# Patient Record
Sex: MALE | Race: WHITE | NOT HISPANIC OR LATINO | URBAN - METROPOLITAN AREA
[De-identification: names, ages, dates, MRNs, and addresses within clinical notes are randomized per-mention and may not be internally consistent; named-entity substitution may affect disease eponyms.]

---
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
## 2023-09-15 PROCEDURE — 87070 CULTURE OTHR SPECIMN AEROBIC: CPT

## 2023-09-15 PROCEDURE — 87205 SMEAR GRAM STAIN: CPT

## 2023-09-21 NOTE — PROGRESS NOTES
"Patient: Adam Buerger    YOB: 2000    Date: 09/22/2023    Primary Care Provider: Provider, No Known    Chief Complaint   Patient presents with    Cyst     Pilonodal        SUBJECTIVE:    History of present illness:  I saw the patient in the office today for the evaluation and treatment for a pilonidal cyst.  Patient was recently seen at Urgent Care where they started him on an oral antibiotics.  Wound cultures were negative. The patient states he has had the cyst for 6 months. He has bleeding from there cyst but no infection.  This is very close to the anus, unable to keep clean completely.  Stating \"closure.    The following portions of the patient's history were reviewed and updated as appropriate: allergies, current medications, past family history, past medical history, past social history, past surgical history and problem list.      Review of Systems   Constitutional:  Negative for chills, fever and unexpected weight change.   HENT:  Negative for trouble swallowing and voice change.    Eyes:  Negative for visual disturbance.   Respiratory:  Negative for apnea, cough, chest tightness, shortness of breath and wheezing.    Cardiovascular:  Negative for chest pain, palpitations and leg swelling.   Gastrointestinal:  Negative for abdominal distention, abdominal pain, anal bleeding, blood in stool, constipation, diarrhea, nausea, rectal pain and vomiting.   Endocrine: Negative for cold intolerance and heat intolerance.   Genitourinary:  Negative for difficulty urinating, dysuria, flank pain, scrotal swelling and testicular pain.   Musculoskeletal:  Negative for back pain, gait problem and joint swelling.   Skin:  Positive for wound. Negative for color change and rash.   Neurological:  Negative for dizziness, syncope, speech difficulty, weakness, numbness and headaches.   Hematological:  Negative for adenopathy. Does not bruise/bleed easily.   Psychiatric/Behavioral:  Negative for confusion. The " "patient is not nervous/anxious.      Allergies:  Allergies   Allergen Reactions    Penicillins Hives       Medications:    Current Outpatient Medications:     FLUoxetine (PROzac) 20 MG capsule, Take 1 capsule by mouth Daily., Disp: , Rfl:     mupirocin (BACTROBAN) 2 % ointment, Apply 1 application  topically to the appropriate area as directed 3 (Three) Times a Day., Disp: 22 g, Rfl: 0    sulfamethoxazole-trimethoprim (BACTRIM DS,SEPTRA DS) 800-160 MG per tablet, Take 1 tablet by mouth 2 (Two) Times a Day for 7 days., Disp: 14 tablet, Rfl: 0    History:  Past Medical History:   Diagnosis Date    Anxiety        Past Surgical History:   Procedure Laterality Date    DENTAL PROCEDURE      wisdom teeth       History reviewed. No pertinent family history.    Social History     Tobacco Use    Smoking status: Never    Smokeless tobacco: Never   Vaping Use    Vaping Use: Never used   Substance Use Topics    Alcohol use: Yes     Comment: very rare    Drug use: Never        OBJECTIVE:    Vital Signs:   Vitals:    09/22/23 1403   BP: 104/64   Pulse: 84   Temp: 98.4 °F (36.9 °C)   SpO2: 98%   Weight: 103 kg (228 lb)   Height: 177.8 cm (70\")       Physical Exam:   General Appearance:    Alert, cooperative, in no acute distress   Head:    Normocephalic, without obvious abnormality, atraumatic   Eyes:            Lids and lashes normal, conjunctivae and sclerae normal, no   icterus, no pallor, corneas clear, PERRLA   Ears:    Ears appear intact with no abnormalities noted   Throat:   No oral lesions, no thrush, oral mucosa moist   Neck:   No adenopathy, supple, trachea midline, no thyromegaly, no   carotid bruit, no JVD   Lungs:     Clear to auscultation,respirations regular, even and                  unlabored    Heart:    Regular rhythm and normal rate, normal S1 and S2, no            murmur, no gallop, no rub, no click   Chest Wall:    No abnormalities observed   Abdomen:     Normal bowel sounds, no masses, no organomegaly, soft  "       non-tender, non-distended, no guarding, no rebound                tenderness   Extremities:   Moves all extremities well, no edema, no cyanosis, no             redness   Pulses:   Pulses palpable and equal bilaterally   Skin:   No bleeding, bruising or rash.    Cyst, 4 cm  Granulation tissue, persistent only 1 to 2 cm from the rectal opening.   Lymph nodes:   No palpable adenopathy   Neurologic:   Cranial nerves 2 - 12 grossly intact, sensation intact, DTR       present and equal bilaterally     Results Review:   I reviewed the patient's new clinical results.    Review of Systems was reviewed and confirmed as accurate as documented by the MA.    ASSESSMENT/PLAN:    1. Pilonidal cyst        Patient has a pilonidal cyst very close to the anal opening.  He is agreeable to be seen back over to surgery for evaluation and management.  He will follow-up with me as needed.    I discussed the patients findings and my recommendations with patient        Electronically signed by Chloé Jones MD  09/22/23

## 2023-09-22 ENCOUNTER — OFFICE VISIT (OUTPATIENT)
Dept: SURGERY | Facility: CLINIC | Age: 23
End: 2023-09-22
Payer: COMMERCIAL

## 2023-09-22 VITALS
SYSTOLIC BLOOD PRESSURE: 104 MMHG | WEIGHT: 228 LBS | BODY MASS INDEX: 32.64 KG/M2 | DIASTOLIC BLOOD PRESSURE: 64 MMHG | TEMPERATURE: 98.4 F | HEART RATE: 84 BPM | HEIGHT: 70 IN | OXYGEN SATURATION: 98 %

## 2023-09-22 DIAGNOSIS — L05.91 PILONIDAL CYST: Primary | ICD-10-CM

## 2023-09-22 PROCEDURE — 99203 OFFICE O/P NEW LOW 30 MIN: CPT | Performed by: SURGERY

## 2023-12-07 PROCEDURE — 88304 TISSUE EXAM BY PATHOLOGIST: CPT | Performed by: COLON & RECTAL SURGERY

## 2023-12-08 ENCOUNTER — LAB REQUISITION (OUTPATIENT)
Dept: LAB | Facility: HOSPITAL | Age: 23
End: 2023-12-08
Payer: COMMERCIAL

## 2023-12-08 DIAGNOSIS — L05.91 PILONIDAL CYST WITHOUT ABSCESS: ICD-10-CM

## 2023-12-11 LAB
CYTO UR: NORMAL
LAB AP CASE REPORT: NORMAL
LAB AP CLINICAL INFORMATION: NORMAL
LAB AP DIAGNOSIS COMMENT: NORMAL
PATH REPORT.FINAL DX SPEC: NORMAL
PATH REPORT.GROSS SPEC: NORMAL

## 2025-02-26 ENCOUNTER — OFFICE VISIT (OUTPATIENT)
Dept: FAMILY MEDICINE CLINIC | Facility: CLINIC | Age: 25
End: 2025-02-26
Payer: COMMERCIAL

## 2025-02-26 VITALS
HEIGHT: 70 IN | OXYGEN SATURATION: 98 % | DIASTOLIC BLOOD PRESSURE: 78 MMHG | WEIGHT: 251.2 LBS | SYSTOLIC BLOOD PRESSURE: 114 MMHG | TEMPERATURE: 98.4 F | HEART RATE: 97 BPM | RESPIRATION RATE: 20 BRPM | BODY MASS INDEX: 35.96 KG/M2

## 2025-02-26 DIAGNOSIS — Z83.438 FAMILY HISTORY OF HYPERLIPIDEMIA: ICD-10-CM

## 2025-02-26 DIAGNOSIS — F41.9 ANXIETY AND DEPRESSION: ICD-10-CM

## 2025-02-26 DIAGNOSIS — Z76.89 ENCOUNTER TO ESTABLISH CARE WITH NEW DOCTOR: ICD-10-CM

## 2025-02-26 DIAGNOSIS — R41.840 POOR CONCENTRATION: Primary | ICD-10-CM

## 2025-02-26 DIAGNOSIS — Z13.29 SCREENING FOR THYROID DISORDER: ICD-10-CM

## 2025-02-26 DIAGNOSIS — F32.A ANXIETY AND DEPRESSION: ICD-10-CM

## 2025-02-26 RX ORDER — ATOMOXETINE 40 MG/1
40 CAPSULE ORAL DAILY
COMMUNITY
Start: 2025-02-18

## 2025-02-26 NOTE — PROGRESS NOTES
New Patient Office Visit      Date: 2025  Patient Name: Adam John Buerger  : 2000   MRN: [id number]     Chief Complaint:    Chief Complaint   Patient presents with    Behavioral [facility] Referral    Establish Care       History of Present Illness: Adam John Buerger is a 24 y.o. male who presents today to establish care.    Reports had pcp in .  No one since.  Went to college in [facility] and now out of college.  Reports he went to [facility].    Request possible referral to psychiatry, or neurology office.  Reports always had trouble focusing but starting to have memory issues.  Reports Alzheimer's runs in the family and wanted to get this looked at to be safe.  Great Grandmother had Alzheimers or Dementia in general.  He is not sure of the type of dementia.    Following with psych out of [facility] in [facility].    Prescribed Strattera for ADHD and Prozac for Anxiety and Depression.    Also wanted to have a local therapist because his current doctor is in [facility], and he lives in [place].     From reconcile outside meds, Strattera as noted, Prozac as noted    Reports he has trouble with focus and concentrating.  Strattera helps some.      Reports mostly has trouble remembering conversations, and schedules.        Subjective      Review of Systems:   Review of Systems   Constitutional:  Negative for chills and fever.   Gastrointestinal:  Negative for nausea and vomiting.   Neurological:  Negative for seizures and syncope.   Psychiatric/Behavioral:  Negative for suicidal ideas. The patient is nervous/anxious.         + Depression         Past Medical History:   Past Medical History:   Diagnosis Date    ADHD (attention deficit hyperactivity disorder)     Not diagnosed but my former therapist and current psychiatrist are pretty certain. Need resources to get proper diagnosis.    Anxiety     Depression     Seasonal       Past Surgical History:   Past Surgical History:   Procedure  "Laterality Date    DENTAL PROCEDURE      wisdom teeth       Family History:   Family History   Problem Relation Age of Onset    Cancer Mother         Ovarian Cancer    Depression Brother        Social History:   Social History     Socioeconomic History    Marital status: Single   Tobacco Use    Smoking status: Never     Passive exposure: Never    Smokeless tobacco: Never   Vaping Use    Vaping status: Never Used   Substance and Sexual Activity    Alcohol use: Yes     Alcohol/week: 5.0 standard drinks of alcohol     Types: 5 Cans of beer per week     Comment: very rare    Drug use: Never    Sexual activity: Yes     Partners: Female     Birth control/protection: Condom, Natural family planning/Rhythm       Medications:   Current Outpatient Medications   Medication Sig Dispense Refill    atomoxetine (STRATTERA) 40 MG capsule Take 1 capsule by mouth Daily.      FLUoxetine (PROzac) 40 MG capsule Take 1 capsule by mouth Daily.       No current facility-administered medications for this visit.        Allergies:   Allergies   Allergen Reactions    Penicillins Hives       Objective     Physical Exam:  Vital Signs:   Vitals:    02/26/25 1057   BP: 114/78   BP Location: Left arm   Patient Position: Sitting   Cuff Size: Large Adult   Pulse: 97   Resp: 20   Temp: 98.4 °F (36.9 °C)   TempSrc: Temporal   SpO2: 98%   Weight: 114 kg (251 lb 3.2 oz)   Height: 177.8 cm (70\")   PainSc: 0-No pain     Body mass index is 36.04 kg/m².     BMI is >= 30 and <35. (Class 1 Obesity). The following options were offered after discussion;: exercise counseling/recommendations and nutrition counseling/recommendations       Physical Exam  Vitals and nursing note reviewed.   Constitutional:       Appearance: Normal appearance.   HENT:      Head: Normocephalic and atraumatic.   Neck:      Vascular: No carotid bruit.   Cardiovascular:      Rate and Rhythm: Normal rate and regular rhythm.      Heart sounds: Normal heart sounds. No murmur " heard.  Pulmonary:      Effort: Pulmonary effort is normal.      Breath sounds: Normal breath sounds.   Abdominal:      General: Bowel sounds are normal.      Palpations: Abdomen is soft. There is no mass.      Tenderness: There is no abdominal tenderness.   Musculoskeletal:      Right lower leg: No edema.      Left lower leg: No edema.   Skin:     Coloration: Skin is not jaundiced or pale.      Findings: No erythema.   Neurological:      Mental Status: He is alert. Mental status is at baseline.   Psychiatric:         Mood and Affect: Mood normal.         Behavior: Behavior normal.         Procedures    PHQ-9: PHQ-9 Total Score:       POCT Results (if applicable):   Results for orders placed or performed in visit on 12/07/23   Tissue Pathology Exam    Collection Time: 12/07/23  8:17 AM    Specimen: Pilonidal cyst; Tissue   Result Value Ref Range    Case Report       Surgical Pathology Report                         Case: [id number]                                  Authorizing Provider:  Heri Bustillos MD        Collected:           12/07/2023 08:17 AM          Ordering Location:     [facility]   Received:            12/08/2023 08:17 AM                                 LABORATORY                                                                   Pathologist:           Kaya Miller MD                                                   Specimen:    Pilonidal cyst, Pilonidal Cyst                                                             Clinical Information       Pilonidal cyst without abscess      Final Diagnosis       PILONIDAL CYST   Dermal scar formation with chronic inflammation and rare foreign body type giant cells   See comment      Comment       These findings could represent the sequelae of a ruptured cyst.  An intact cyst is not identified in this material.      Gross Description       1. Pilonidal cyst.  Received in formalin labeled pilonidal cyst is a 2.7 x 1.1 x 0.9 cm moderately  disrupted, unoriented portion of tan skin and underlying soft tissue.  Sectioning reveals a possible disrupted cystic cavity up to 1 cm in greatest dimension.  No gross skin lesions are identified.  The specimen is submitted entirely in 2 cassettes.  LDP        Microscopic Description       The slides are reviewed and demonstrate histopathologic features supporting the above rendered diagnosis.           Measures:   Advanced Care Planning:   Patient does not have an advance directive, declines further assistance.    Smoking Cessation:   Does not smoke          Assessment / Plan      Assessment/Plan:     1. Poor concentration  1-2 referral to behavioral health.  Patient to continue current medications for now  - Ambulatory Referral to Behavioral Health    2. Anxiety and depression  As above.  - Ambulatory Referral to Behavioral Health    3. Screening for thyroid disorder  Check TSH.  - TSH; Future    4. Family history of hyperlipidemia  Check fasting lipid panel.  - Lipid Panel; Future    5. Encounter to establish care with new doctor  Check CBC, CMP.  - CBC & Differential; Future  - Comprehensive Metabolic Panel; Future         Part of this note may be an electronic transcription/translation of spoken language to printed text using the Dragon Dictation System.      Vaccine Counseling:      Follow Up:   Return in about 3 months (around 5/26/2025), or if symptoms worsen or fail to improve.      DO RACHEL Walsh

## 2025-03-12 ENCOUNTER — LAB (OUTPATIENT)
Dept: LAB | Facility: HOSPITAL | Age: 25
End: 2025-03-12
Payer: COMMERCIAL

## 2025-03-12 DIAGNOSIS — Z13.29 SCREENING FOR THYROID DISORDER: ICD-10-CM

## 2025-03-12 DIAGNOSIS — Z83.438 FAMILY HISTORY OF HYPERLIPIDEMIA: ICD-10-CM

## 2025-03-12 DIAGNOSIS — Z76.89 ENCOUNTER TO ESTABLISH CARE WITH NEW DOCTOR: ICD-10-CM

## 2025-03-12 LAB
ALBUMIN SERPL-MCNC: 3.7 G/DL (ref 3.5–5.2)
ALBUMIN/GLOB SERPL: 1.1 G/DL
ALP SERPL-CCNC: 60 U/L (ref 39–117)
ALT SERPL W P-5'-P-CCNC: 75 U/L (ref 1–41)
ANION GAP SERPL CALCULATED.3IONS-SCNC: 12.9 MMOL/L (ref 5–15)
AST SERPL-CCNC: 30 U/L (ref 1–40)
BASOPHILS # BLD AUTO: 0.06 10*3/MM3 (ref 0–0.2)
BASOPHILS NFR BLD AUTO: 0.8 % (ref 0–1.5)
BILIRUB SERPL-MCNC: 0.4 MG/DL (ref 0–1.2)
BUN SERPL-MCNC: 9 MG/DL (ref 6–20)
BUN/CREAT SERPL: 7.4 (ref 7–25)
CALCIUM SPEC-SCNC: 9.8 MG/DL (ref 8.6–10.5)
CHLORIDE SERPL-SCNC: 104 MMOL/L (ref 98–107)
CHOLEST SERPL-MCNC: 264 MG/DL (ref 0–200)
CO2 SERPL-SCNC: 25.1 MMOL/L (ref 22–29)
CREAT SERPL-MCNC: 1.22 MG/DL (ref 0.76–1.27)
DEPRECATED RDW RBC AUTO: 39.8 FL (ref 37–54)
EGFRCR SERPLBLD CKD-EPI 2021: 84.9 ML/MIN/1.73
EOSINOPHIL # BLD AUTO: 0.05 10*3/MM3 (ref 0–0.4)
EOSINOPHIL NFR BLD AUTO: 0.7 % (ref 0.3–6.2)
ERYTHROCYTE [DISTWIDTH] IN BLOOD BY AUTOMATED COUNT: 13.2 % (ref 12.3–15.4)
GLOBULIN UR ELPH-MCNC: 3.4 GM/DL
GLUCOSE SERPL-MCNC: 90 MG/DL (ref 65–99)
HCT VFR BLD AUTO: 45.3 % (ref 37.5–51)
HDLC SERPL-MCNC: 37 MG/DL (ref 40–60)
HGB BLD-MCNC: 14.9 G/DL (ref 13–17.7)
IMM GRANULOCYTES # BLD AUTO: 0.04 10*3/MM3 (ref 0–0.05)
IMM GRANULOCYTES NFR BLD AUTO: 0.6 % (ref 0–0.5)
LDLC SERPL CALC-MCNC: 185 MG/DL (ref 0–100)
LDLC/HDLC SERPL: 4.95 {RATIO}
LYMPHOCYTES # BLD AUTO: 2.73 10*3/MM3 (ref 0.7–3.1)
LYMPHOCYTES NFR BLD AUTO: 37.8 % (ref 19.6–45.3)
MCH RBC QN AUTO: 27.4 PG (ref 26.6–33)
MCHC RBC AUTO-ENTMCNC: 32.9 G/DL (ref 31.5–35.7)
MCV RBC AUTO: 83.3 FL (ref 79–97)
MONOCYTES # BLD AUTO: 0.67 10*3/MM3 (ref 0.1–0.9)
MONOCYTES NFR BLD AUTO: 9.3 % (ref 5–12)
NEUTROPHILS NFR BLD AUTO: 3.68 10*3/MM3 (ref 1.7–7)
NEUTROPHILS NFR BLD AUTO: 50.8 % (ref 42.7–76)
NRBC BLD AUTO-RTO: 0 /100 WBC (ref 0–0.2)
PLATELET # BLD AUTO: 372 10*3/MM3 (ref 140–450)
PMV BLD AUTO: 9.8 FL (ref 6–12)
POTASSIUM SERPL-SCNC: 4 MMOL/L (ref 3.5–5.2)
PROT SERPL-MCNC: 7.1 G/DL (ref 6–8.5)
RBC # BLD AUTO: 5.44 10*6/MM3 (ref 4.14–5.8)
SODIUM SERPL-SCNC: 142 MMOL/L (ref 136–145)
TRIGL SERPL-MCNC: 220 MG/DL (ref 0–150)
TSH SERPL DL<=0.05 MIU/L-ACNC: 1.97 UIU/ML (ref 0.27–4.2)
VLDLC SERPL-MCNC: 42 MG/DL (ref 5–40)
WBC NRBC COR # BLD AUTO: 7.23 10*3/MM3 (ref 3.4–10.8)

## 2025-03-12 PROCEDURE — 80061 LIPID PANEL: CPT

## 2025-03-12 PROCEDURE — 80050 GENERAL HEALTH PANEL: CPT

## 2025-04-02 ENCOUNTER — TELEMEDICINE (OUTPATIENT)
Dept: PSYCHIATRY | Facility: CLINIC | Age: 25
End: 2025-04-02
Payer: COMMERCIAL

## 2025-04-02 DIAGNOSIS — F98.8 ADD (ATTENTION DEFICIT DISORDER) WITHOUT HYPERACTIVITY: Primary | Chronic | ICD-10-CM

## 2025-04-02 DIAGNOSIS — F41.1 GENERALIZED ANXIETY DISORDER: Chronic | ICD-10-CM

## 2025-04-02 PROBLEM — F90.2 ATTENTION DEFICIT HYPERACTIVITY DISORDER, COMBINED TYPE: Status: ACTIVE | Noted: 2025-04-02

## 2025-04-02 RX ORDER — ATOMOXETINE 60 MG/1
60 CAPSULE ORAL DAILY
Qty: 30 CAPSULE | Refills: 0 | Status: SHIPPED | OUTPATIENT
Start: 2025-04-02

## 2025-04-02 RX ORDER — FLUOXETINE HYDROCHLORIDE 40 MG/1
40 CAPSULE ORAL DAILY
Qty: 30 CAPSULE | Refills: 0 | Status: SHIPPED | OUTPATIENT
Start: 2025-04-02

## 2025-04-02 NOTE — PROGRESS NOTES
This provider is located at [place], [place]. The Patient is seen remotely using Video. Patient is being seen via telehealth and confirm that they are in a secure environment for this session. Patient is located in [place], [place] at his home. The patient's condition being diagnosed/treated is appropriate for telemedicine. Provider identified as Javan Baker as well as credentials APRN MSN PMHNP-BC.   The client/patient gave consent to be seen remotely, and when consent is given they understand that the consent allows for patient identifiable information to be sent to a third party as needed.  They may refuse to be seen remotely at any time. The electronic data is encrypted and password protected, and the patient has been advised of the potential risks to privacy not withstanding such measures.    Subjective     Adam John Buerger is a 24 y.o. male who presents today for initial evaluation     Chief Complaint: Anxiety and ADHD    History of Present Illness: This is the first encounter for this APRN with the patient.  Patient is a referral from his primary care physician for evaluation and management of anxiety and ADHD.  Patient had been seen a psychiatrist in [facility].  He is now graduated from [employer] and living back in [place].  He states it is a hardship for him to go there to keep appointments, so he was referred here to continue his treatment.  He states he has never formally been diagnosed with ADHD.  He states he does have several symptoms.  Did the adult ADHD symptom checklist and patient does have a lot of procrastination issues.  Also has issues with being very disorganized.  States he is easily distracted by outside noises.  States he loses things a lot.  States he has been on Strattera.  States originally the 40 mg dose was controlling his symptoms, but currently feels like it is not doing what it was doing before.  He states he does have a history of depression, but  that has not been much of an issue since he has started on the Prozac.  He states in the past when his depression was worse that he would isolate himself, staying in bed, have feelings of hopelessness and worthlessness.  Denies any hopelessness currently.  Denies any suicidal or homicidal ideation.  States that his sleep and appetite are good.  Currently thinks his anxiety is in a good place.  Rates anxiety a 3-4 and 1-10 scale with 10 being the worst.  States in the past he had a lot of worry and catastrophic type thinking, but states that is well-managed now.  States he does okay in crowds and elicits very crowded such as at [place] time.  Denies any history of any manic-type symptoms.  Denies any paranoia.  Denies any auditory or visual hallucinations.    The following portions of the patient's history were reviewed and updated as appropriate: allergies, current medications, past family history, past medical history, past social history, past surgical history and problem list.    Past Psychiatric History: Denies any history of inpatient psychiatric hospitalizations.  Denies any history of suicide attempts.  Prozac and Strattera are the only psychiatric medications he has been on.    Family Psychiatric History: Denies any family history.  No suicides among first-degree relatives.    Substance Use History: Drinks alcohol occasionally.  Denies any marijuana or drug use.    Past Medical History:  Past Medical History:   Diagnosis Date    ADHD (attention deficit hyperactivity disorder)     Not diagnosed but my former therapist and current psychiatrist are pretty certain. Need resources to get proper diagnosis.    Anxiety     Depression     Seasonal       Social History: Patient was born and raised in [place], [place].  He was raised by his mother and father.  He has 1 brother.  He is a graduate of [employer].  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  He has been with his partner for 2-1/2  years.  Has no children.  Denies any legal issues.  Main hobbies are playing the [employer] and video games.  Social History     Socioeconomic History    Marital status: Single   Tobacco Use    Smoking status: Never     Passive exposure: Never    Smokeless tobacco: Never   Vaping Use    Vaping status: Never Used   Substance and Sexual Activity    Alcohol use: Yes     Alcohol/week: 5.0 standard drinks of alcohol     Types: 5 Cans of beer per week     Comment: very rare    Drug use: Never    Sexual activity: Yes     Partners: Female     Birth control/protection: Condom, Natural family planning/Rhythm       Family History:  Family History   Problem Relation Age of Onset    Cancer Mother         Ovarian Cancer    Depression Brother        Past Surgical History:  Past Surgical History:   Procedure Laterality Date    DENTAL PROCEDURE      wisdom teeth       Problem List:  Patient Active Problem List   Diagnosis    ADD (attention deficit disorder) without hyperactivity    Generalized anxiety disorder       Allergy:   Allergies   Allergen Reactions    Penicillins Hives        Current Medications:   Current Outpatient Medications   Medication Sig Dispense Refill    atomoxetine (STRATTERA) 60 MG capsule Take 1 capsule by mouth Daily. 30 capsule 0    FLUoxetine (PROzac) 40 MG capsule Take 1 capsule by mouth Daily. 30 capsule 0     No current facility-administered medications for this visit.       Review of Symptoms:    Review of Systems   Constitutional: Negative.    HENT: Negative.     Eyes: Negative.    Respiratory: Negative.     Cardiovascular: Negative.    Gastrointestinal: Negative.    Endocrine: Negative.    Genitourinary: Negative.    Musculoskeletal: Negative.    Skin: Negative.    Allergic/Immunologic: Negative.    Neurological: Negative.    Hematological: Negative.    Psychiatric/Behavioral:  Positive for decreased concentration. The patient is nervous/anxious.          Physical Exam:   There were no vitals taken for  this visit.    Appearance: Normal  Gait, Station, Strength: Within normal limits    Mental Status Exam:   Hygiene:   good  Cooperation:  Cooperative  Eye Contact:  Good  Psychomotor Behavior:  Appropriate  Affect:  Full range  Mood: anxious  Hopelessness: Denies  Speech:  Normal  Thought Process:  Goal directed  Thought Content:  Normal  Suicidal:  None  Homicidal:  None  Hallucinations:  None  Delusion:  None  Memory:  Intact  Orientation:  Person, Place, Time, and Situation  Reliability:  good  Insight:  Good  Judgement:  Good  Impulse Control:  Good    PHQ-9 Depression Screening  Little interest or pleasure in doing things? (Patient-Rptd) Several days   Feeling down, depressed, or hopeless? (Patient-Rptd) Not at all   PHQ-2 Total Score (Patient-Rptd) 1   Trouble falling or staying asleep, or sleeping too much? (Patient-Rptd) Over half   Feeling tired or having little energy? (Patient-Rptd) Several days   Poor appetite or overeating? (Patient-Rptd) Not at all   Feeling bad about yourself - or that you are a failure or have let yourself or your family down? (Patient-Rptd) Not at all   Trouble concentrating on things, such as reading the newspaper or watching television? (Patient-Rptd) Almost all   Moving or speaking so slowly that other people could have noticed? Or the opposite - being so fidgety or restless that you have been moving around a lot more than usual? (Patient-Rptd) Over half   Thoughts that you would be better off dead, or of hurting yourself in some way? (Patient-Rptd) Not at all   PHQ-9 Total Score (Patient-Rptd) 9   If you checked off any problems, how difficult have these problems made it for you to do your work, take care of things at home, or get along with other people? (Patient-Rptd) Very difficult       PHQ-9 Total Score: (Patient-Rptd) 9     KEVIN 7 anxiety screening tool that patient filled out virtually reviewed by this APRN at today's encounter.    PROMIS scale screening tool that patient  filled out virtually reviewed by this APRN at today's encounter.    Previous Provider notes and available records reviewed by this APRN today.     Lab Results:   Lab on 03/12/2025   Component Date Value Ref Range Status    Total Cholesterol 03/12/2025 264 (H)  0 - 200 mg/dL Final    Triglycerides 03/12/2025 220 (H)  0 - 150 mg/dL Final    HDL Cholesterol 03/12/2025 37 (L)  40 - 60 mg/dL Final    LDL Cholesterol  03/12/2025 185 (H)  0 - 100 mg/dL Final    VLDL Cholesterol 03/12/2025 42 (H)  5 - 40 mg/dL Final    LDL/HDL Ratio 03/12/2025 4.95   Final    TSH 03/12/2025 1.970  0.270 - 4.200 uIU/mL Final    Glucose 03/12/2025 90  65 - 99 mg/dL Final    BUN 03/12/2025 9  6 - 20 mg/dL Final    Creatinine 03/12/2025 1.22  0.76 - 1.27 mg/dL Final    Sodium 03/12/2025 142  136 - 145 mmol/L Final    Potassium 03/12/2025 4.0  3.5 - 5.2 mmol/L Final    Chloride 03/12/2025 104  98 - 107 mmol/L Final    CO2 03/12/2025 25.1  22.0 - 29.0 mmol/L Final    Calcium 03/12/2025 9.8  8.6 - 10.5 mg/dL Final    Total Protein 03/12/2025 7.1  6.0 - 8.5 g/dL Final    Albumin 03/12/2025 3.7  3.5 - 5.2 g/dL Final    ALT (SGPT) 03/12/2025 75 (H)  1 - 41 U/L Final    AST (SGOT) 03/12/2025 30  1 - 40 U/L Final    Alkaline Phosphatase 03/12/2025 60  39 - 117 U/L Final    Total Bilirubin 03/12/2025 0.4  0.0 - 1.2 mg/dL Final    Globulin 03/12/2025 3.4  gm/dL Final    A/G Ratio 03/12/2025 1.1  g/dL Final    BUN/Creatinine Ratio 03/12/2025 7.4  7.0 - 25.0 Final    Anion Gap 03/12/2025 12.9  5.0 - 15.0 mmol/L Final    eGFR 03/12/2025 84.9  >60.0 mL/min/1.73 Final    WBC 03/12/2025 7.23  3.40 - 10.80 10*3/mm3 Final    RBC 03/12/2025 5.44  4.14 - 5.80 10*6/mm3 Final    Hemoglobin 03/12/2025 14.9  13.0 - 17.7 g/dL Final    Hematocrit 03/12/2025 45.3  37.5 - 51.0 % Final    MCV 03/12/2025 83.3  79.0 - 97.0 fL Final    MCH 03/12/2025 27.4  26.6 - 33.0 pg Final    MCHC 03/12/2025 32.9  31.5 - 35.7 g/dL Final    RDW 03/12/2025 13.2  12.3 - 15.4 % Final     RDW-SD 03/12/2025 39.8  37.0 - 54.0 fl Final    MPV 03/12/2025 9.8  6.0 - 12.0 fL Final    Platelets 03/12/2025 372  140 - 450 10*3/mm3 Final    Neutrophil % 03/12/2025 50.8  42.7 - 76.0 % Final    Lymphocyte % 03/12/2025 37.8  19.6 - 45.3 % Final    Monocyte % 03/12/2025 9.3  5.0 - 12.0 % Final    Eosinophil % 03/12/2025 0.7  0.3 - 6.2 % Final    Basophil % 03/12/2025 0.8  0.0 - 1.5 % Final    Immature Grans % 03/12/2025 0.6 (H)  0.0 - 0.5 % Final    Neutrophils, Absolute 03/12/2025 3.68  1.70 - 7.00 10*3/mm3 Final    Lymphocytes, Absolute 03/12/2025 2.73  0.70 - 3.10 10*3/mm3 Final    Monocytes, Absolute 03/12/2025 0.67  0.10 - 0.90 10*3/mm3 Final    Eosinophils, Absolute 03/12/2025 0.05  0.00 - 0.40 10*3/mm3 Final    Basophils, Absolute 03/12/2025 0.06  0.00 - 0.20 10*3/mm3 Final    Immature Grans, Absolute 03/12/2025 0.04  0.00 - 0.05 10*3/mm3 Final    nRBC 03/12/2025 0.0  0.0 - 0.2 /100 WBC Final       Assessment & Plan   Problems Addressed this Visit          Mental Health    ADD (attention deficit disorder) without hyperactivity - Primary    Relevant Medications    atomoxetine (STRATTERA) 60 MG capsule    FLUoxetine (PROzac) 40 MG capsule    Other Relevant Orders    Ambulatory Referral to Behavioral Health    Generalized anxiety disorder    Relevant Medications    atomoxetine (STRATTERA) 60 MG capsule    FLUoxetine (PROzac) 40 MG capsule     Diagnoses         Codes Comments      ADD (attention deficit disorder) without hyperactivity    -  Primary ICD-10-CM: F98.8  ICD-9-CM: 314.00       Generalized anxiety disorder     ICD-10-CM: F41.1  ICD-9-CM: 300.02             Visit Diagnoses:    ICD-10-CM ICD-9-CM   1. ADD (attention deficit disorder) without hyperactivity  F98.8 314.00   2. Generalized anxiety disorder  F41.1 300.02     Discussed treatment options with patient.  Discussed with patient that it appears that his symptoms are likely coming from ADHD.  Discussed with patient if he has ever been formally  tested for ADHD.  He states no but he would like to do so.  We will put in referral for ADHD [facility] at [facility].  Also discussed with patient that we can increase his Strattera currently to target the ongoing symptoms he is having.  Patient agreeable.  Increase Strattera to 60 mg daily for ADHD.  Continue Prozac 40 mg daily for anxiety.  We will see patient again in 4 weeks to reassess.  Encouraged patient to contact the office if he has any issues sooner.    TREATMENT PLAN/GOALS: Continue supportive psychotherapy efforts and medications as indicated. Treatment and medication options discussed during today's visit. Patient acknowledged and verbally consented to continue with current treatment plan and was educated on the importance of compliance with treatment and follow-up appointments.    Short Term Goals: Patient will be compliant with medication, and patient will have no significant medication related side effects.  Patient will be engaged in psychotherapy as indicated.  Patient will report subjective improvement of symptoms.    Long term goals: To stabilize mood and treat/improve subjective symptoms, the patient will stay out of the hospital, the patient will be at an optimal level of functioning, and the patient will take all medications as prescribed.  The patient verbalized understanding and agreement with goals that were mutually set.    MEDICATION ISSUES:    Discussed medication options and treatment plan of prescribed medication as well as the risks, benefits, and side effects including potential falls, possible impaired driving and metabolic adversities among others. Patient is agreeable to call the office with any worsening of symptoms or onset of side effects. Patient is agreeable to call 911 or go to the nearest ER should he/she begin having SI/HI. If patient has any concerns or needs assistance they were instructed to call the [facility] at  [phone].    MEDS ORDERED DURING VISIT:  New Medications Ordered This Visit   Medications    atomoxetine (STRATTERA) 60 MG capsule     Sig: Take 1 capsule by mouth Daily.     Dispense:  30 capsule     Refill:  0    FLUoxetine (PROzac) 40 MG capsule     Sig: Take 1 capsule by mouth Daily.     Dispense:  30 capsule     Refill:  0       Return in about 4 weeks (around 4/30/2025) for Video visit.             This document has been electronically signed by ROMAN Anguiano  April 2, 2025 11:50 EDT    Part of this note may be an electronic transmission of spoken language to printed text using the Dragon Dictation System.

## 2025-05-05 DIAGNOSIS — F98.8 ADD (ATTENTION DEFICIT DISORDER) WITHOUT HYPERACTIVITY: Chronic | ICD-10-CM

## 2025-05-05 RX ORDER — ATOMOXETINE 60 MG/1
60 CAPSULE ORAL DAILY
Qty: 30 CAPSULE | Refills: 0 | Status: SHIPPED | OUTPATIENT
Start: 2025-05-05 | End: 2025-05-07 | Stop reason: SDUPTHER

## 2025-05-07 ENCOUNTER — TELEMEDICINE (OUTPATIENT)
Dept: PSYCHIATRY | Facility: CLINIC | Age: 25
End: 2025-05-07
Payer: COMMERCIAL

## 2025-05-07 DIAGNOSIS — F41.1 GENERALIZED ANXIETY DISORDER: Chronic | ICD-10-CM

## 2025-05-07 DIAGNOSIS — F98.8 ADD (ATTENTION DEFICIT DISORDER) WITHOUT HYPERACTIVITY: Chronic | ICD-10-CM

## 2025-05-07 RX ORDER — ATOMOXETINE 60 MG/1
60 CAPSULE ORAL DAILY
Qty: 30 CAPSULE | Refills: 0 | Status: SHIPPED | OUTPATIENT
Start: 2025-05-07

## 2025-05-07 RX ORDER — FLUOXETINE HYDROCHLORIDE 60 MG/1
60 TABLET, FILM COATED ORAL; ORAL DAILY
Qty: 30 TABLET | Refills: 0 | Status: SHIPPED | OUTPATIENT
Start: 2025-05-07

## 2025-05-07 NOTE — PROGRESS NOTES
This provider is located at [place], [place]. The Patient is seen remotely using Video. Patient is being seen via telehealth and confirm that they are in a secure environment for this session. Patient is located in [place], [place] at his home. The patient's condition being diagnosed/treated is appropriate for telemedicine. Provider identified as Javan Baker as well as credentials APRN MSN PMHNP-BC.   The client/patient gave consent to be seen remotely, and when consent is given they understand that the consent allows for patient identifiable information to be sent to a third party as needed.  They may refuse to be seen remotely at any time. The electronic data is encrypted and password protected, and the patient has been advised of the potential risks to privacy not withstanding such measures.    Chief Complaint  Anxiety and ADHD    Subjective        Adam John Buerger presents to [facility] for   History of Present Illness  Patient seen today for follow-up visit for anxiety and ADHD.  Patient reports the increase in Strattera has been helpful for his ADHD symptoms.  States he is pleased with that.  States he has been feeling more anxious with worry and overthinking type symptoms recently.  States he has been more evident when he is cooking food and worried about not fixing it properly and somebody getting sick.  He is asking for help in that area.  States work is going well.  States overall sleep and appetite are good. Denies hopelessness. Denies suicidal or homicidal ideation. Denies any manic type symptoms. Denies any paranoia. Denies any auditory of visual hallucinations. Denies any side effects to the medications.    Objective   Vital Signs:   There were no vitals taken for this visit.      Mental Status Exam:   Hygiene:   good  Cooperation:  Cooperative  Eye Contact:  Good  Psychomotor Behavior:  Appropriate  Affect:  Full range  Mood: anxious  Speech:  Normal  Thought  Process:  Goal directed  Thought Content:  Normal  Suicidal:  None  Homicidal:  None  Hallucinations:  None  Delusion:  None  Memory:  Intact  Orientation:  Person, Place, Time, and Situation  Reliability:  good  Insight:  Good  Judgement:  Good  Impulse Control:  Good  Physical/Medical Issues:  No      PHQ-9 Depression Screening  Little interest or pleasure in doing things? (Patient-Rptd) Several days   Feeling down, depressed, or hopeless? (Patient-Rptd) Several days   PHQ-2 Total Score (Patient-Rptd) 2   Trouble falling or staying asleep, or sleeping too much? (Patient-Rptd) Almost all   Feeling tired or having little energy? (Patient-Rptd) Several days   Poor appetite or overeating? (Patient-Rptd) Not at all   Feeling bad about yourself - or that you are a failure or have let yourself or your family down? (Patient-Rptd) Several days   Trouble concentrating on things, such as reading the newspaper or watching television? (Patient-Rptd) Over half   Moving or speaking so slowly that other people could have noticed? Or the opposite - being so fidgety or restless that you have been moving around a lot more than usual? (Patient-Rptd) Several days   Thoughts that you would be better off dead, or of hurting yourself in some way? (Patient-Rptd) Not at all   PHQ-9 Total Score (Patient-Rptd) 10   If you checked off any problems, how difficult have these problems made it for you to do your work, take care of things at home, or get along with other people? (Patient-Rptd) Very difficult         PHQ-9 Total Score: (Patient-Rptd) 10     KEVIN-7  Feeling nervous, anxious or on edge: (Patient-Rptd) More than half the days  Not being able to stop or control worrying: (Patient-Rptd) More than half the days  Worrying too much about different things: (Patient-Rptd) Several days  Trouble Relaxing: (Patient-Rptd) Not at all  Being so restless that it is hard to sit still: (Patient-Rptd) More than half the days  Feeling afraid as if  something awful might happen: (Patient-Rptd) Several days  Becoming easily annoyed or irritable: (Patient-Rptd) Not at all  KEVIN 7 Total Score: (Patient-Rptd) 8  If you checked any problems, how difficult have these problems made it for you to do your work, take care of things at home, or get along with other people: (Patient-Rptd) Somewhat difficult    Previous Provider notes and available records reviewed by this APRN today.   Current Medications:   Current Outpatient Medications   Medication Sig Dispense Refill    atomoxetine (STRATTERA) 60 MG capsule Take 1 capsule by mouth Daily. 30 capsule 0    FLUoxetine (PROzac) 60 MG tablet Take 1 tablet by mouth Daily. 30 tablet 0     No current facility-administered medications for this visit.       Physical Exam   Result Review :    The following data was reviewed by: ROMAN Anguiano on 05/07/2025:  Common labs          3/12/2025    09:40   Common Labs   Glucose 90    BUN 9    Creatinine 1.22    Sodium 142    Potassium 4.0    Chloride 104    Calcium 9.8    Albumin 3.7    Total Bilirubin 0.4    Alkaline Phosphatase 60    AST (SGOT) 30    ALT (SGPT) 75    WBC 7.23    Hemoglobin 14.9    Hematocrit 45.3    Platelets 372    Total Cholesterol 264    Triglycerides 220    HDL Cholesterol 37    LDL Cholesterol  185         Assessment and Plan   Problem List Items Addressed This Visit          Mental Health    ADD (attention deficit disorder) without hyperactivity    Relevant Medications    FLUoxetine (PROzac) 60 MG tablet    atomoxetine (STRATTERA) 60 MG capsule    Generalized anxiety disorder    Relevant Medications    FLUoxetine (PROzac) 60 MG tablet    atomoxetine (STRATTERA) 60 MG capsule     Discussed treatment options with patient.  Continue Strattera 60 mg daily for ADHD.  Discussed the patient's increased anxiety.  Discussed increasing Prozac.  Patient agreeable.  Increase Prozac to 60 mg daily for anxiety.  We will see patient again in 4 weeks to reassess.   Encouraged patient to contact the office if he has any issues sooner.    TREATMENT PLAN/GOALS: Continue supportive psychotherapy efforts and medications as indicated. Treatment and medication options discussed during today's visit. Patient ackowledged and verbally consented to continue with current treatment plan and was educated on the importance of compliance with treatment and follow-up appointments.    DEPRESSION:  Patient screened positive for depression based on a PHQ-9 score of 10 on 4/30/2025. Follow-up recommendations include: Suicide Risk Assessment performed.       MEDICATION ISSUES:  We discussed risks, benefits, and side effects of the above medications and the patient was agreeable with the plan. Patient was educated on the importance of compliance with treatment and follow-up appointments.  Patient is agreeable to call the office with any worsening of symptoms or onset of side effects. Patient is agreeable to call 911 or go to the nearest ER should he/she begin having SI/HI.      Counseled patient regarding multimodal approach with healthy nutrition, healthy sleep, regular physical activity, social activities, counseling, and medications.      Coping skills reviewed and encouraged positive framing of thoughts     Assisted patient in processing above session content; acknowledged and normalized patient’s thoughts, feelings, and concerns.  Applied  positive coping skills and behavior management in session.  Allowed patient to freely discuss issues without interruption or judgment. Provided safe, confidential environment to facilitate the development of positive therapeutic relationship and encourage open, honest communication. Assisted patient in identifying risk factors which would indicate the need for higher level of care including thoughts to harm self or others and/or self-harming behavior and encouraged patient to contact this office, call 911, or present to the nearest emergency room should any  of these events occur. Discussed crisis intervention services and means to access. If patient has any concerns or needs assistance they were instructed to call the [facility] at [phone].    MEDS ORDERED DURING VISIT:  New Medications Ordered This Visit   Medications    FLUoxetine (PROzac) 60 MG tablet     Sig: Take 1 tablet by mouth Daily.     Dispense:  30 tablet     Refill:  0    atomoxetine (STRATTERA) 60 MG capsule     Sig: Take 1 capsule by mouth Daily.     Dispense:  30 capsule     Refill:  0         Follow Up   Return in about 4 weeks (around 6/4/2025) for Video visit.    Patient was given instructions and counseling regarding his condition or for health maintenance advice. Please see specific information pulled into the AVS if appropriate.         This document has been electronically signed by ROMAN Anguiano  May 7, 2025 11:20 EDT    Part of this note may be an electronic transcription/translation of spoken language to printed text using the Dragon Dictation System.

## 2025-05-12 DIAGNOSIS — R53.83 TIREDNESS: Primary | ICD-10-CM

## 2025-05-12 DIAGNOSIS — R68.82 DECREASED LIBIDO: ICD-10-CM

## 2025-05-12 DIAGNOSIS — R53.83 FATIGUE, UNSPECIFIED TYPE: ICD-10-CM

## 2025-05-14 ENCOUNTER — LAB (OUTPATIENT)
Dept: LAB | Facility: HOSPITAL | Age: 25
End: 2025-05-14
Payer: COMMERCIAL

## 2025-05-14 DIAGNOSIS — R68.82 DECREASED LIBIDO: ICD-10-CM

## 2025-05-14 DIAGNOSIS — R53.83 FATIGUE, UNSPECIFIED TYPE: ICD-10-CM

## 2025-05-14 DIAGNOSIS — R53.83 TIREDNESS: ICD-10-CM

## 2025-05-14 LAB — TESTOST SERPL-MCNC: 268 NG/DL (ref 249–836)

## 2025-05-14 PROCEDURE — 84403 ASSAY OF TOTAL TESTOSTERONE: CPT

## 2025-05-14 PROCEDURE — 36415 COLL VENOUS BLD VENIPUNCTURE: CPT

## 2025-05-28 ENCOUNTER — OFFICE VISIT (OUTPATIENT)
Dept: FAMILY MEDICINE CLINIC | Facility: CLINIC | Age: 25
End: 2025-05-28
Payer: COMMERCIAL

## 2025-05-28 VITALS
HEART RATE: 92 BPM | TEMPERATURE: 98 F | DIASTOLIC BLOOD PRESSURE: 82 MMHG | HEIGHT: 70 IN | OXYGEN SATURATION: 98 % | WEIGHT: 261 LBS | BODY MASS INDEX: 37.37 KG/M2 | SYSTOLIC BLOOD PRESSURE: 114 MMHG

## 2025-05-28 DIAGNOSIS — F41.1 GENERALIZED ANXIETY DISORDER: ICD-10-CM

## 2025-05-28 DIAGNOSIS — E78.49 FAMILIAL HYPERLIPIDEMIA: Primary | ICD-10-CM

## 2025-05-28 DIAGNOSIS — F98.8 ADD (ATTENTION DEFICIT DISORDER) WITHOUT HYPERACTIVITY: ICD-10-CM

## 2025-05-28 PROCEDURE — 99214 OFFICE O/P EST MOD 30 MIN: CPT | Performed by: FAMILY MEDICINE

## 2025-05-28 RX ORDER — ATORVASTATIN CALCIUM 10 MG/1
10 TABLET, FILM COATED ORAL DAILY
Qty: 90 TABLET | Refills: 1 | Status: SHIPPED | OUTPATIENT
Start: 2025-05-28

## 2025-05-28 NOTE — PATIENT INSTRUCTIONS
Start Lipitor daily.  Take at bedtime.  Take medications as ordered.  Low fat, low salt diet.  Exercise as tolerated.

## 2025-05-28 NOTE — PROGRESS NOTES
Follow Up Office Visit      Date: 2025   Patient Name: Adam John Buerger  : 2000   MRN: [id number]     Chief Complaint:    Chief Complaint   Patient presents with    Hyperlipidemia       History of Present Illness: Adam John Buerger is a 24 y.o. male who presents today     Last seen by me on 2025.  Was seen to establish care.      Had reported trouble focusing and starting to have memory issues.  Reported Alzheimer's runs in the family and wanted to get this looked at.  Great-grandmother had Alzheimer's or dementia General.  Patient was not sure of the type of dementia.      When seen, patient is reported following with psychiatry out of [facility] in [facility].  Prescribe Strattera for ADHD and Prozac for anxiety and depression.      Had reported having trouble focusing concentrating.  Reported Strattera helped some.    Patient was referred to behavioral health.    Reports they upped his prozac to 60 mg.  Taking this for generalized anxiety disorder.  Still taking Strattera 60 mg.      Reports has trouble with remembering dates and future plans.  Reports can forget what he has been told.  Discussed with patient this may be related to his ADD.  To have ADHD evaluation in November.      Labs showed cholesterol 264, , triglycerides 220, HDL low at 37.  Labs done on 3/12/2025                    Subjective      Review of Systems:   Review of Systems   Constitutional:  Negative for chills and fever.   Gastrointestinal:  Negative for nausea and vomiting.   Neurological:  Negative for seizures and syncope.       I have reviewed the patients family history, social history, past medical history, past surgical history and have updated it as appropriate.     Medications:     Current Outpatient Medications:     atomoxetine (STRATTERA) 60 MG capsule, Take 1 capsule by mouth Daily., Disp: 30 capsule, Rfl: 0    FLUoxetine (PROzac) 60 MG tablet, Take 1 tablet by mouth Daily., Disp: 30  "tablet, Rfl: 0    atorvastatin (LIPITOR) 10 MG tablet, Take 1 tablet by mouth Daily., Disp: 90 tablet, Rfl: 1    Allergies:   Allergies   Allergen Reactions    Penicillins Hives       Objective     Physical Exam: Please see above  Vital Signs:   Vitals:    05/28/25 1105   BP: 114/82   Pulse: 92   Temp: 98 °F (36.7 °C)   TempSrc: Infrared   SpO2: 98%   Weight: 118 kg (261 lb)   Height: 177.8 cm (70\")   PainSc: 0-No pain     Body mass index is 37.45 kg/m².          Physical Exam  Vitals and nursing note reviewed.   Constitutional:       Appearance: Normal appearance.   HENT:      Head: Normocephalic and atraumatic.   Neck:      Vascular: No carotid bruit.   Cardiovascular:      Rate and Rhythm: Normal rate and regular rhythm.      Heart sounds: Normal heart sounds. No murmur heard.  Pulmonary:      Effort: Pulmonary effort is normal.      Breath sounds: Normal breath sounds.   Abdominal:      General: Bowel sounds are normal.      Palpations: Abdomen is soft. There is no mass.      Tenderness: There is no abdominal tenderness.   Musculoskeletal:      Right lower leg: No edema.      Left lower leg: No edema.   Skin:     Coloration: Skin is not jaundiced or pale.      Findings: No erythema.   Neurological:      Mental Status: He is alert. Mental status is at baseline.   Psychiatric:         Mood and Affect: Mood normal.         Behavior: Behavior normal.         Procedures    Results:   Labs:   TSH   Date Value Ref Range Status   03/12/2025 1.970 0.270 - 4.200 uIU/mL Final        POCT Results (if applicable):   Results for orders placed or performed in visit on 05/14/25   Testosterone    Collection Time: 05/14/25  8:47 AM    Specimen: Blood   Result Value Ref Range    Testosterone, Total 268.00 249.00 - 836.00 ng/dL       PHQ-9: PHQ-9 Total Score:       Imaging:   No valid procedures specified.     Measures:   Advanced Care Planning:   Patient does not have an advance directive, declines further assistance.    Smoking " Cessation:   Does not smoke    Assessment / Plan      Assessment/Plan:     1. Familial hyperlipidemia  Patient is to start Lipitor 10 mg tablet daily at bedtime.  Encourage patient to follow low-fat diet.  Also encouraged patient to exercise as tolerated.  Patient to follow-up in 3 months to recheck lipids and see if dose is appropriate.    - atorvastatin (LIPITOR) 10 MG tablet; Take 1 tablet by mouth Daily.  Dispense: 90 tablet; Refill: 1    2. ADD (attention deficit disorder) without hyperactivity  2-3 patient to continue to follow with psych  Currently prescribed Strattera 60 mg capsule daily    3. Generalized anxiety disorder  Continue to follow with psych.  Currently prescribed Prozac 60 mg tablet daily.          Part of this note may be an electronic transcription/translation of spoken language to printed text using the Dragon Dictation System.      Vaccine Counseling:      Follow Up:   Return in about 3 months (around 8/28/2025) for Follow Up lipids.      DO RACHEL Chew

## 2025-06-04 ENCOUNTER — TELEMEDICINE (OUTPATIENT)
Dept: PSYCHIATRY | Facility: CLINIC | Age: 25
End: 2025-06-04
Payer: COMMERCIAL

## 2025-06-04 DIAGNOSIS — F98.8 ADD (ATTENTION DEFICIT DISORDER) WITHOUT HYPERACTIVITY: Chronic | ICD-10-CM

## 2025-06-04 DIAGNOSIS — F41.1 GENERALIZED ANXIETY DISORDER: Chronic | ICD-10-CM

## 2025-06-04 RX ORDER — FLUOXETINE HYDROCHLORIDE 60 MG/1
60 TABLET, FILM COATED ORAL; ORAL DAILY
Qty: 30 TABLET | Refills: 1 | Status: SHIPPED | OUTPATIENT
Start: 2025-06-04

## 2025-06-04 RX ORDER — ATOMOXETINE 60 MG/1
60 CAPSULE ORAL DAILY
Qty: 30 CAPSULE | Refills: 1 | Status: SHIPPED | OUTPATIENT
Start: 2025-06-04

## 2025-06-04 NOTE — PROGRESS NOTES
This provider is located at [place], [place]. The Patient is seen remotely using Video. Patient is being seen via telehealth and confirm that they are in a secure environment for this session. Patient is located in [place], [place] at his home. The patient's condition being diagnosed/treated is appropriate for telemedicine. Provider identified as Javan Baker as well as credentials APRN MSN PMHNP-BC.   The client/patient gave consent to be seen remotely, and when consent is given they understand that the consent allows for patient identifiable information to be sent to a third party as needed.  They may refuse to be seen remotely at any time. The electronic data is encrypted and password protected, and the patient has been advised of the potential risks to privacy not withstanding such measures.    Chief Complaint  Anxiety and ADHD    Subjective        Adam John Buerger presents to [facility] for   History of Present Illness  Patient seen today for follow-up visit for anxiety and ADHD.  Patient reports that the increase in Prozac has been good for his anxiety.  States he has not had any of the issues that he was having before over the last month.  He states he has been having a couple of days a week of slight depressed type feeling that would last a couple of hours.  States he has not been able to figure that out.  States Strattera continues to help with his focus and attention symptoms.  Sleep and appetite are good.  States he and his fiancée went to the [employer] festival and had a good time this past weekend. Denies hopelessness. Denies suicidal or homicidal ideation. Denies any manic type symptoms. Denies any paranoia. Denies any auditory of visual hallucinations. Denies any side effects to the medications.    Objective   Vital Signs:   There were no vitals taken for this visit.      Mental Status Exam:   Hygiene:   good  Cooperation:  Cooperative  Eye Contact:   Good  Psychomotor Behavior:  Appropriate  Affect:  Full range  Mood: normal  Speech:  Normal  Thought Process:  Goal directed  Thought Content:  Normal  Suicidal:  None  Homicidal:  None  Hallucinations:  None  Delusion:  None  Memory:  Intact  Orientation:  Person, Place, Time, and Situation  Reliability:  good  Insight:  Good  Judgement:  Good  Impulse Control:  Good  Physical/Medical Issues:  No      PHQ-9 Depression Screening  Little interest or pleasure in doing things? (Patient-Rptd) Several days   Feeling down, depressed, or hopeless? (Patient-Rptd) Not at all   PHQ-2 Total Score (Patient-Rptd) 1   Trouble falling or staying asleep, or sleeping too much? (Patient-Rptd) Several days   Feeling tired or having little energy? (Patient-Rptd) Several days   Poor appetite or overeating? (Patient-Rptd) Not at all   Feeling bad about yourself - or that you are a failure or have let yourself or your family down? (Patient-Rptd) Not at all   Trouble concentrating on things, such as reading the newspaper or watching television? (Patient-Rptd) Almost all   Moving or speaking so slowly that other people could have noticed? Or the opposite - being so fidgety or restless that you have been moving around a lot more than usual? (Patient-Rptd) Several days   Thoughts that you would be better off dead, or of hurting yourself in some way? (Patient-Rptd) Not at all   PHQ-9 Total Score (Patient-Rptd) 7   If you checked off any problems, how difficult have these problems made it for you to do your work, take care of things at home, or get along with other people? (Patient-Rptd) Very difficult         PHQ-9 Total Score: (Patient-Rptd) 7     KEVIN-7  Feeling nervous, anxious or on edge: (Patient-Rptd) Several days  Not being able to stop or control worrying: (Patient-Rptd) Not at all  Worrying too much about different things: (Patient-Rptd) Not at all  Trouble Relaxing: (Patient-Rptd) Not at all  Being so restless that it is hard to sit  still: (Patient-Rptd) Several days  Feeling afraid as if something awful might happen: (Patient-Rptd) Not at all  Becoming easily annoyed or irritable: (Patient-Rptd) Not at all  KEVIN 7 Total Score: (Patient-Rptd) 2  If you checked any problems, how difficult have these problems made it for you to do your work, take care of things at home, or get along with other people: (Patient-Rptd) Somewhat difficult    Previous Provider notes and available records reviewed by this APRN today.   Current Medications:   Current Outpatient Medications   Medication Sig Dispense Refill    atomoxetine (STRATTERA) 60 MG capsule Take 1 capsule by mouth Daily. 30 capsule 1    atorvastatin (LIPITOR) 10 MG tablet Take 1 tablet by mouth Daily. 90 tablet 1    FLUoxetine (PROzac) 60 MG tablet Take 1 tablet by mouth Daily. 30 tablet 1     No current facility-administered medications for this visit.       Physical Exam   Result Review :    The following data was reviewed by: ROMAN Anguiano on 06/04/2025:  Common labs          3/12/2025    09:40   Common Labs   Glucose 90    BUN 9    Creatinine 1.22    Sodium 142    Potassium 4.0    Chloride 104    Calcium 9.8    Albumin 3.7    Total Bilirubin 0.4    Alkaline Phosphatase 60    AST (SGOT) 30    ALT (SGPT) 75    WBC 7.23    Hemoglobin 14.9    Hematocrit 45.3    Platelets 372    Total Cholesterol 264    Triglycerides 220    HDL Cholesterol 37    LDL Cholesterol  185         Assessment and Plan   Problem List Items Addressed This Visit          Mental Health    ADD (attention deficit disorder) without hyperactivity    Relevant Medications    FLUoxetine (PROzac) 60 MG tablet    atomoxetine (STRATTERA) 60 MG capsule    Generalized anxiety disorder    Relevant Medications    FLUoxetine (PROzac) 60 MG tablet    atomoxetine (STRATTERA) 60 MG capsule     Discussed treatment options with patient.  Continue Prozac 60 mg daily for anxiety.  Continue Strattera 60 mg daily for ADHD.  Encouraged patient  to monitor the depressive feelings that he is having to see if there is any situational stressors at that time that could be contributing to these feelings.  Discussed with patient that we will monitor that ongoing.  Patient agreeable.  We will see patient again in 6 weeks to reassess.  Encouraged patient to contact the office if he has any issues sooner.    TREATMENT PLAN/GOALS: Continue supportive psychotherapy efforts and medications as indicated. Treatment and medication options discussed during today's visit. Patient ackowledged and verbally consented to continue with current treatment plan and was educated on the importance of compliance with treatment and follow-up appointments.    DEPRESSION:  Patient screened positive for depression based on a PHQ-9 score of 7 on 5/28/2025. Follow-up recommendations include: Suicide Risk Assessment performed.       MEDICATION ISSUES:  We discussed risks, benefits, and side effects of the above medications and the patient was agreeable with the plan. Patient was educated on the importance of compliance with treatment and follow-up appointments.  Patient is agreeable to call the office with any worsening of symptoms or onset of side effects. Patient is agreeable to call 911 or go to the nearest ER should he/she begin having SI/HI.      Counseled patient regarding multimodal approach with healthy nutrition, healthy sleep, regular physical activity, social activities, counseling, and medications.      Coping skills reviewed and encouraged positive framing of thoughts     Assisted patient in processing above session content; acknowledged and normalized patient’s thoughts, feelings, and concerns.  Applied  positive coping skills and behavior management in session.  Allowed patient to freely discuss issues without interruption or judgment. Provided safe, confidential environment to facilitate the development of positive therapeutic relationship and encourage open, honest  communication. Assisted patient in identifying risk factors which would indicate the need for higher level of care including thoughts to harm self or others and/or self-harming behavior and encouraged patient to contact this office, call 911, or present to the nearest emergency room should any of these events occur. Discussed crisis intervention services and means to access. If patient has any concerns or needs assistance they were instructed to call the [facility] at [phone].    MEDS ORDERED DURING VISIT:  New Medications Ordered This Visit   Medications    FLUoxetine (PROzac) 60 MG tablet     Sig: Take 1 tablet by mouth Daily.     Dispense:  30 tablet     Refill:  1    atomoxetine (STRATTERA) 60 MG capsule     Sig: Take 1 capsule by mouth Daily.     Dispense:  30 capsule     Refill:  1         Follow Up   Return in about 6 weeks (around 7/16/2025) for Video visit.    Patient was given instructions and counseling regarding his condition or for health maintenance advice. Please see specific information pulled into the AVS if appropriate.         This document has been electronically signed by ROMAN Anguiano  June 4, 2025 11:24 EDT    Part of this note may be an electronic transcription/translation of spoken language to printed text using the Dragon Dictation System.

## 2025-06-08 DIAGNOSIS — F98.8 ADD (ATTENTION DEFICIT DISORDER) WITHOUT HYPERACTIVITY: Chronic | ICD-10-CM

## 2025-07-03 DIAGNOSIS — E78.49 FAMILIAL HYPERLIPIDEMIA: ICD-10-CM

## 2025-07-03 RX ORDER — ATORVASTATIN CALCIUM 10 MG/1
10 TABLET, FILM COATED ORAL DAILY
Qty: 90 TABLET | Refills: 1 | Status: SHIPPED | OUTPATIENT
Start: 2025-07-03

## 2025-07-07 DIAGNOSIS — F41.1 GENERALIZED ANXIETY DISORDER: ICD-10-CM

## 2025-07-07 DIAGNOSIS — F98.8 ADD (ATTENTION DEFICIT DISORDER) WITHOUT HYPERACTIVITY: ICD-10-CM

## 2025-07-07 DIAGNOSIS — Z63.9 RELATIONSHIP PROBLEMS: Primary | ICD-10-CM

## 2025-07-07 SDOH — SOCIAL STABILITY - SOCIAL INSECURITY: PROBLEM RELATED TO PRIMARY SUPPORT GROUP, UNSPECIFIED: Z63.9

## 2025-07-09 ENCOUNTER — TELEMEDICINE (OUTPATIENT)
Dept: PSYCHIATRY | Facility: CLINIC | Age: 25
End: 2025-07-09
Payer: COMMERCIAL

## 2025-07-09 DIAGNOSIS — F41.1 GENERALIZED ANXIETY DISORDER: Chronic | ICD-10-CM

## 2025-07-09 DIAGNOSIS — F98.8 ADD (ATTENTION DEFICIT DISORDER) WITHOUT HYPERACTIVITY: Chronic | ICD-10-CM

## 2025-07-09 RX ORDER — FLUOXETINE HYDROCHLORIDE 60 MG/1
60 TABLET, FILM COATED ORAL; ORAL DAILY
Qty: 30 TABLET | Refills: 0 | Status: SHIPPED | OUTPATIENT
Start: 2025-07-09

## 2025-07-09 RX ORDER — ATOMOXETINE 80 MG/1
80 CAPSULE ORAL DAILY
Qty: 30 CAPSULE | Refills: 0 | Status: SHIPPED | OUTPATIENT
Start: 2025-07-09

## 2025-07-09 RX ORDER — ATOMOXETINE 60 MG/1
60 CAPSULE ORAL DAILY
Qty: 30 CAPSULE | Refills: 1 | OUTPATIENT
Start: 2025-07-09

## 2025-07-09 NOTE — PROGRESS NOTES
This provider is located at [place], [place]. The Patient is seen remotely using Video. Patient is being seen via telehealth and confirm that they are in a secure environment for this session. Patient is located in [place], [place] at his home. The patient's condition being diagnosed/treated is appropriate for telemedicine. Provider identified as Javan Baker as well as credentials APRN MSN PMHNP-BC.   The client/patient gave consent to be seen remotely, and when consent is given they understand that the consent allows for patient identifiable information to be sent to a third party as needed.  They may refuse to be seen remotely at any time. The electronic data is encrypted and password protected, and the patient has been advised of the potential risks to privacy not withstanding such measures.    Chief Complaint  Anxiety and ADHD    Subjective        Adam John Buerger presents to [facility] for   History of Present Illness  Patient seen today for a follow-up visit for anxiety and ADHD.  Patient reports he is doing well.  States he has not had any issues with depression over the last month.  States anxiety has been well managed.  States he is still having some ADHD symptoms.  States he is having some procrastination and trouble getting started on things he needs to do.  He is asking for help in that area.  States he and his wife have been enjoying going to concerts.  Sleep and appetite are good.  States he has been having increased sweating since starting the medications.  Denies any other side effects outside of that.  Discussed with patient that these medicines can contribute to that.  He is going to talk to his primary care physician about possible medications to help with the sweating. Denies hopelessness. Denies suicidal or homicidal ideation. Denies any manic type symptoms. Denies any paranoia. Denies any auditory of visual hallucinations.   Vital Signs:   There were no  vitals taken for this visit.      Mental Status Exam:   Hygiene:   good  Cooperation:  Cooperative  Eye Contact:  Good  Psychomotor Behavior:  Appropriate  Affect:  Full range  Mood: normal  Speech:  Normal  Thought Process:  Goal directed  Thought Content:  Normal  Suicidal:  None  Homicidal:  None  Hallucinations:  None  Delusion:  None  Memory:  Intact  Orientation:  Person, Place, Time, and Situation  Reliability:  good  Insight:  Good  Judgement:  Good  Impulse Control:  Good  Physical/Medical Issues:  No      PHQ-9 Depression Screening  Little interest or pleasure in doing things? (Patient-Rptd) Several days   Feeling down, depressed, or hopeless? (Patient-Rptd) Not at all   PHQ-2 Total Score (Patient-Rptd) 1   Trouble falling or staying asleep, or sleeping too much? (Patient-Rptd) Several days   Feeling tired or having little energy? (Patient-Rptd) Several days   Poor appetite or overeating? (Patient-Rptd) Not at all   Feeling bad about yourself - or that you are a failure or have let yourself or your family down? (Patient-Rptd) Not at all   Trouble concentrating on things, such as reading the newspaper or watching television? (Patient-Rptd) Almost all   Moving or speaking so slowly that other people could have noticed? Or the opposite - being so fidgety or restless that you have been moving around a lot more than usual? (Patient-Rptd) Several days   Thoughts that you would be better off dead, or of hurting yourself in some way? (Patient-Rptd) Not at all   PHQ-9 Total Score (Patient-Rptd) 7   If you checked off any problems, how difficult have these problems made it for you to do your work, take care of things at home, or get along with other people? (Patient-Rptd) Somewhat difficult         PHQ-9 Total Score: (Patient-Rptd) 7     KEVIN-7  Feeling nervous, anxious or on edge: (Patient-Rptd) Several days  Not being able to stop or control worrying: (Patient-Rptd) Not at all  Worrying too much about different  things: (Patient-Rptd) Not at all  Trouble Relaxing: (Patient-Rptd) Several days  Being so restless that it is hard to sit still: (Patient-Rptd) Several days  Feeling afraid as if something awful might happen: (Patient-Rptd) Not at all  Becoming easily annoyed or irritable: (Patient-Rptd) Not at all  KEVIN 7 Total Score: (Patient-Rptd) 3  If you checked any problems, how difficult have these problems made it for you to do your work, take care of things at home, or get along with other people: (Patient-Rptd) Somewhat difficult    Previous Provider notes and available records reviewed by this APRN today.   Current Medications:   Current Outpatient Medications   Medication Sig Dispense Refill    atomoxetine (STRATTERA) 80 MG capsule Take 1 capsule by mouth Daily. 30 capsule 0    atorvastatin (LIPITOR) 10 MG tablet Take 1 tablet by mouth Daily. 90 tablet 1    FLUoxetine (PROzac) 60 MG tablet Take 1 tablet by mouth Daily. 30 tablet 0     No current facility-administered medications for this visit.       Physical Exam   Result Review :    The following data was reviewed by: ROMAN Anguiano on 07/09/2025:  Common labs          3/12/2025    09:40   Common Labs   Glucose 90    BUN 9    Creatinine 1.22    Sodium 142    Potassium 4.0    Chloride 104    Calcium 9.8    Albumin 3.7    Total Bilirubin 0.4    Alkaline Phosphatase 60    AST (SGOT) 30    ALT (SGPT) 75    WBC 7.23    Hemoglobin 14.9    Hematocrit 45.3    Platelets 372    Total Cholesterol 264    Triglycerides 220    HDL Cholesterol 37    LDL Cholesterol  185         Assessment and Plan   Problem List Items Addressed This Visit          Mental Health    ADD (attention deficit disorder) without hyperactivity    Relevant Medications    FLUoxetine (PROzac) 60 MG tablet    atomoxetine (STRATTERA) 80 MG capsule    Generalized anxiety disorder    Relevant Medications    FLUoxetine (PROzac) 60 MG tablet    atomoxetine (STRATTERA) 80 MG capsule     Discussed treatment  options with patient.  Discussed with patient that we can increase his Strattera to 80 mg daily for ADHD.  Continue Prozac 60 mg daily for anxiety.  We will see patient again in 4 weeks to reassess.  Encouraged patient to contact the office if he has any issues sooner.    TREATMENT PLAN/GOALS: Continue supportive psychotherapy efforts and medications as indicated. Treatment and medication options discussed during today's visit. Patient ackowledged and verbally consented to continue with current treatment plan and was educated on the importance of compliance with treatment and follow-up appointments.    DEPRESSION:  Patient screened positive for depression based on a PHQ-9 score of 7 on 7/9/2025. Follow-up recommendations include: Suicide Risk Assessment performed.       MEDICATION ISSUES:  We discussed risks, benefits, and side effects of the above medications and the patient was agreeable with the plan. Patient was educated on the importance of compliance with treatment and follow-up appointments.  Patient is agreeable to call the office with any worsening of symptoms or onset of side effects. Patient is agreeable to call 911 or go to the nearest ER should he/she begin having SI/HI.      Counseled patient regarding multimodal approach with healthy nutrition, healthy sleep, regular physical activity, social activities, counseling, and medications.      Coping skills reviewed and encouraged positive framing of thoughts     Assisted patient in processing above session content; acknowledged and normalized patient’s thoughts, feelings, and concerns.  Applied  positive coping skills and behavior management in session.  Allowed patient to freely discuss issues without interruption or judgment. Provided safe, confidential environment to facilitate the development of positive therapeutic relationship and encourage open, honest communication. Assisted patient in identifying risk factors which would indicate the need for  higher level of care including thoughts to harm self or others and/or self-harming behavior and encouraged patient to contact this office, call 911, or present to the nearest emergency room should any of these events occur. Discussed crisis intervention services and means to access. If patient has any concerns or needs assistance they were instructed to call the [facility] at [phone].    MEDS ORDERED DURING VISIT:  New Medications Ordered This Visit   Medications    FLUoxetine (PROzac) 60 MG tablet     Sig: Take 1 tablet by mouth Daily.     Dispense:  30 tablet     Refill:  0    atomoxetine (STRATTERA) 80 MG capsule     Sig: Take 1 capsule by mouth Daily.     Dispense:  30 capsule     Refill:  0         Follow Up   Return in about 4 weeks (around 8/6/2025) for Video visit.    Patient was given instructions and counseling regarding his condition or for health maintenance advice. Please see specific information pulled into the AVS if appropriate.         This document has been electronically signed by ROMAN Anguiano  July 9, 2025 16:45 EDT    Part of this note may be an electronic transcription/translation of spoken language to printed text using the Dragon Dictation System.

## 2025-07-30 ENCOUNTER — OFFICE VISIT (OUTPATIENT)
Dept: FAMILY MEDICINE CLINIC | Facility: CLINIC | Age: 25
End: 2025-07-30
Payer: COMMERCIAL

## 2025-07-30 VITALS
TEMPERATURE: 98.6 F | OXYGEN SATURATION: 98 % | SYSTOLIC BLOOD PRESSURE: 128 MMHG | HEIGHT: 70 IN | HEART RATE: 111 BPM | DIASTOLIC BLOOD PRESSURE: 80 MMHG | BODY MASS INDEX: 37.65 KG/M2 | WEIGHT: 263 LBS

## 2025-07-30 DIAGNOSIS — F98.8 ADD (ATTENTION DEFICIT DISORDER) WITHOUT HYPERACTIVITY: ICD-10-CM

## 2025-07-30 DIAGNOSIS — R61 HYPERHIDROSIS: Primary | ICD-10-CM

## 2025-07-30 DIAGNOSIS — F41.1 GENERALIZED ANXIETY DISORDER: ICD-10-CM

## 2025-07-30 PROCEDURE — 99214 OFFICE O/P EST MOD 30 MIN: CPT | Performed by: FAMILY MEDICINE

## 2025-07-30 RX ORDER — OXYBUTYNIN CHLORIDE 5 MG/1
5 TABLET, EXTENDED RELEASE ORAL DAILY
Qty: 30 TABLET | Refills: 2 | Status: SHIPPED | OUTPATIENT
Start: 2025-07-30

## 2025-07-30 NOTE — PATIENT INSTRUCTIONS
Start Oxybutynin daily.  Take medications as ordered.  Low fat, low salt diet.  Exercise as tolerated.  Continue to follow with psych.

## 2025-07-30 NOTE — PROGRESS NOTES
Follow Up Office Visit      Date: 2025   Patient Name: Adam John Buerger  : 2000   MRN: [id number]     Chief Complaint:    Chief Complaint   Patient presents with    sweaty     Has been more sweaty than normal. States that this started approx 1 yr ago       History of Present Illness: Adam John Buerger is a 24 y.o. male who presents today reporting excessive sweating.    Reports thinks it started about a year ago.  Reports thinks it may be tied to Strattera.      Patient reports minimal exertion causes excessive sweating.      Following with behavioral,.  Previously reported they upped his prozac to 60 mg .  Taking for generalized anxiety disorder.  Had reported still taking Strattera. 60 mg    Takes Lipitor for lipids.    Sweating is a possible side effect with Prozac.  2-8 %.        TSH was 1.970 on 3/12/25.                    History of Present Illness        Subjective      Review of Systems:   Review of Systems   Constitutional:  Positive for diaphoresis. Negative for chills, fatigue and fever.   HENT:  Negative for congestion, sore throat and swollen glands.    Respiratory:  Negative for cough.    Cardiovascular:  Negative for chest pain.   Gastrointestinal:  Negative for abdominal pain, nausea and vomiting.   Genitourinary:  Negative for dysuria.   Musculoskeletal:  Negative for myalgias and neck pain.   Skin:  Negative for rash.   Neurological:  Negative for weakness and numbness.       I have reviewed the patients family history, social history, past medical history, past surgical history and have updated it as appropriate.     Medications:     Current Outpatient Medications:     atomoxetine (STRATTERA) 80 MG capsule, Take 1 capsule by mouth Daily., Disp: 30 capsule, Rfl: 0    atorvastatin (LIPITOR) 10 MG tablet, Take 1 tablet by mouth Daily., Disp: 90 tablet, Rfl: 1    FLUoxetine (PROzac) 60 MG tablet, Take 1 tablet by mouth Daily., Disp: 30 tablet, Rfl: 0    oxybutynin XL (DITROPAN-XL)  "5 MG 24 hr tablet, Take 1 tablet by mouth Daily., Disp: 30 tablet, Rfl: 2    Allergies:   Allergies   Allergen Reactions    Penicillins Hives       Objective     Physical Exam: Please see above  Vital Signs:   Vitals:    07/30/25 1136   BP: 128/80   Pulse: 111   Temp: 98.6 °F (37 °C)   TempSrc: Infrared   SpO2: 98%   Weight: 119 kg (263 lb)   Height: 177.8 cm (70\")   PainSc: 0-No pain     Body mass index is 37.74 kg/m².          Physical Exam  Vitals and nursing note reviewed.   Constitutional:       Appearance: Normal appearance.   HENT:      Head: Normocephalic and atraumatic.   Neck:      Vascular: No carotid bruit.   Cardiovascular:      Rate and Rhythm: Normal rate and regular rhythm.      Heart sounds: Normal heart sounds. No murmur heard.  Pulmonary:      Effort: Pulmonary effort is normal.      Breath sounds: Normal breath sounds.   Abdominal:      General: Bowel sounds are normal.      Palpations: Abdomen is soft. There is no mass.      Tenderness: There is no abdominal tenderness.   Musculoskeletal:      Right lower leg: No edema.      Left lower leg: No edema.   Skin:     Coloration: Skin is not jaundiced or pale.      Findings: No erythema.   Neurological:      Mental Status: He is alert. Mental status is at baseline.   Psychiatric:         Mood and Affect: Mood normal.         Behavior: Behavior normal.         Procedures    Results:   Labs:   TSH   Date Value Ref Range Status   03/12/2025 1.970 0.270 - 4.200 uIU/mL Final        POCT Results (if applicable):   Results for orders placed or performed in visit on 05/14/25   Testosterone    Collection Time: 05/14/25  8:47 AM    Specimen: Blood   Result Value Ref Range    Testosterone, Total 268.00 249.00 - 836.00 ng/dL       PHQ-9: PHQ-9 Total Score:       Imaging:   No valid procedures specified.     Measures:   Advanced Care Planning:   Patient does not have an advance directive, declines further assistance.    Smoking Cessation:   Does not " smoke    Assessment / Plan      Assessment/Plan:     Assessment & Plan      1. Hyperhidrosis  TSH was normal recently.  Will try oxybutynin to be taken daily.  - oxybutynin XL (DITROPAN-XL) 5 MG 24 hr tablet; Take 1 tablet by mouth Daily.  Dispense: 30 tablet; Refill: 2    2. ADD (attention deficit disorder) without hyperactivity  Patient to continue to follow with psych.  Continue Strattera    3. Generalized anxiety disorder  Patient to continue to follow with psych.  Continue Prozac.          Part of this note may be an electronic transcription/translation of spoken language to printed text using the Dragon Dictation System.        Vaccine Counseling:      Follow Up:   Return in about 4 weeks (around 8/27/2025) for Follow Up hyperhydrosis.          RACHEL Lopez

## 2025-08-06 ENCOUNTER — TELEMEDICINE (OUTPATIENT)
Dept: PSYCHIATRY | Facility: CLINIC | Age: 25
End: 2025-08-06
Payer: COMMERCIAL

## 2025-08-06 DIAGNOSIS — F98.8 ADD (ATTENTION DEFICIT DISORDER) WITHOUT HYPERACTIVITY: Chronic | ICD-10-CM

## 2025-08-06 DIAGNOSIS — F41.1 GENERALIZED ANXIETY DISORDER: Primary | Chronic | ICD-10-CM

## 2025-08-06 RX ORDER — ATOMOXETINE 80 MG/1
80 CAPSULE ORAL DAILY
Qty: 30 CAPSULE | Refills: 0 | Status: SHIPPED | OUTPATIENT
Start: 2025-08-06

## 2025-08-06 RX ORDER — FLUOXETINE HYDROCHLORIDE 60 MG/1
60 TABLET, FILM COATED ORAL; ORAL DAILY
Qty: 30 TABLET | Refills: 0 | Status: SHIPPED | OUTPATIENT
Start: 2025-08-06